# Patient Record
Sex: MALE | Race: NATIVE HAWAIIAN OR OTHER PACIFIC ISLANDER
[De-identification: names, ages, dates, MRNs, and addresses within clinical notes are randomized per-mention and may not be internally consistent; named-entity substitution may affect disease eponyms.]

---

## 2020-03-26 ENCOUNTER — HOSPITAL ENCOUNTER (EMERGENCY)
Dept: HOSPITAL 5 - ED | Age: 50
Discharge: HOME | End: 2020-03-26
Payer: SELF-PAY

## 2020-03-26 VITALS — SYSTOLIC BLOOD PRESSURE: 147 MMHG | DIASTOLIC BLOOD PRESSURE: 99 MMHG
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
## 2020-03-26 DIAGNOSIS — S63.286A: Primary | ICD-10-CM

## 2020-03-26 DIAGNOSIS — S60.811A: ICD-10-CM
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
## 2020-03-26 DIAGNOSIS — S00.81XA: ICD-10-CM
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
## 2020-03-26 PROCEDURE — 73140 X-RAY EXAM OF FINGER(S): CPT

## 2020-03-26 PROCEDURE — 90715 TDAP VACCINE 7 YRS/> IM: CPT

## 2020-03-26 PROCEDURE — 26770 TREAT FINGER DISLOCATION: CPT

## 2020-03-26 PROCEDURE — 73120 X-RAY EXAM OF HAND: CPT

## 2020-03-26 PROCEDURE — 90471 IMMUNIZATION ADMIN: CPT

## 2020-03-26 PROCEDURE — 96372 THER/PROPH/DIAG INJ SC/IM: CPT

## 2020-03-26 PROCEDURE — 99283 EMERGENCY DEPT VISIT LOW MDM: CPT

## 2020-03-26 NOTE — XRAY REPORT
RIGHT HAND 2 VIEWS



INDICATION / CLINICAL INFORMATION:

fall/pain.



COMPARISON:

None available.



FINDINGS:

There is dislocation of the proximal interphalangeal joint of the little finger with complete displac
ement of the phalanges. No definite fracture is seen



Signer Name: Macario Rodgers MD FACTHERESA 

Signed: 3/26/2020 11:37 AM

Workstation Name: [id number]

## 2020-03-26 NOTE — XRAY REPORT
RIGHT LITTLE FINGER 2 VIEWS POSTREDUCTION



INDICATION / CLINICAL INFORMATION:

post reduction film r small finger.



COMPARISON:

3/26/2020 at 1037 hours eastern time



FINDINGS:

Previously seen dislocation of the proximal interphalangeal joint of the little finger has been reduc
ed. A splint has been applied. No definite fracture is seen.



Signer Name: Macario Rodgers MD FACR 

Signed: 3/26/2020 12:30 PM

Workstation Name: [id number]

## 2020-03-26 NOTE — EMERGENCY DEPARTMENT REPORT
ED Laceration HPI





- HPI


Chief Complaint: Wound/Laceration


Stated Complaint: CUT PINKY


Time Seen by Provider: 03/26/20 11:19


Occurred When: Today


Location: Upper Extremity


Severity: mild


Tetanus Status: Not up to Date


Laceration Symptoms: Yes Pain, No Foreign Body Sensation, No Numbness, No 

Weakness


Other History: Patient is a 49-year-old  male who is status post ground-

level mechanical fall today at home.  He fell on an outstretched hand and bent 

his fifth digit backwards.  He comes in with a dislocated finger.  Abrasion to 

the right wrist but with full range of motion.  He also has an abrasion on his 

chin.  He states that his boot shoelace was untied and he tripped and fell.  No 

LOC.  Patient ambulatory to the ER.





ED Review of Systems


ROS: 


Stated complaint: CUT PINKY


Other details as noted in HPI





Comment: All other systems reviewed and negative





ED Past Medical Hx





- Past Medical History


Previous Medical History?: No





- Surgical History


Past Surgical History?: No





- Family History


Family history: no significant





- Social History


Smoking Status: Never Smoker


Substance Use Type: Alcohol





- Medications


Home Medications: 


                                Home Medications











 Medication  Instructions  Recorded  Confirmed  Last Taken  Type


 


cephALEXin [Keflex] 500 mg PO Q12HR #20 cap 03/26/20  Unknown Rx














Laceration Physical Exam





- Exam


General: 


Vital signs noted. No distress. Alert and acting appropriately.





Laceration Location: Upper Extremity


Laceration Exam: Yes Normal Distal CMS, No Foreign Body, No Exposed Tendon, 

Vessel, or Nerve, No Tendon Injury





ED Course


                                   Vital Signs











  03/26/20





  10:05


 


Temperature 98 F


 


Pulse Rate 89


 


Respiratory 20





Rate 


 


Blood Pressure 147/99


 


O2 Sat by Pulse 98





Oximetry 














- Orthopedic Joint Reduction


  ** finger


Consent Obtained: verbal consent


Time Out Performed: Yes


Side: right


Joint Reduction Location: finger


Analgesia: hematoma block


Local Anesthetic Used: Lidocaine 2%


Amount of Anesthetic Used (mls): 2


Technique Used: direct manipulation


Post-Reduction Neuro Exam: intact


Post-Reduction Vascular Exam: intact


Post Reduction X-Ray Obtained: Yes


Post Reduction X-Ray Results: reduced


Splint Applied: Yes


Patient Tolerated Procedure: well





ED Medical Decision Making





- Radiology Data


Radiology results: report reviewed, image reviewed





- Medical Decision Making





X-ray noted to have a dislocation in the proximal joint of the fifth right 

digit.  Finger was reduced and splinted.  Post reduction film noted with 

relocation.





Patient has a puncture wound to the palmar surface of the finger.  He was given 

Ancef IM.  His Tdap was updated.  He was given medication for pain.





Patient is neurovascularly intact pre-and post reduction.  He has full range of 

motion of distal and proximal joints.





Wound was cleaned and dressed.





Patient being discharged home with discharge plan of care.





                                   Vital Signs











  03/26/20





  10:05


 


Temperature 98 F


 


Pulse Rate 89


 


Respiratory 20





Rate 


 


Blood Pressure 147/99


 


O2 Sat by Pulse 98





Oximetry 














- Differential Diagnosis


Rule out fracture


Critical care attestation.: 


If time is entered above; I have spent that time in minutes in the direct care 

of this critically ill patient, excluding procedure time.








ED Disposition


Clinical Impression: 


 Fall, Laceration, Abrasion, Finger dislocation





Disposition: DC-01 TO HOME OR SELFCARE


Is pt being admited?: No


Does the pt Need Aspirin: No


Condition: Stable


Additional Instructions: 


KEEP WOUND CLEAN AND DRY





WEAR SPLINT FOR 3 DAYS





MOTRIN OR TYLENOL FOR PAIN 





MEDICATION AS ORDERED TODAY








Prescriptions: 


cephALEXin [Keflex] 500 mg PO Q12HR #20 cap


Referrals: 


RITO BENZ MD [Staff Physician] - 3-5 Days


Time of Disposition: 12:09